# Patient Record
Sex: FEMALE | Race: WHITE | URBAN - METROPOLITAN AREA
[De-identification: names, ages, dates, MRNs, and addresses within clinical notes are randomized per-mention and may not be internally consistent; named-entity substitution may affect disease eponyms.]

---

## 2017-03-13 ENCOUNTER — OFFICE VISIT (OUTPATIENT)
Dept: CARDIOLOGY CLINIC | Age: 27
End: 2017-03-13

## 2017-03-13 VITALS
DIASTOLIC BLOOD PRESSURE: 76 MMHG | HEART RATE: 74 BPM | WEIGHT: 181.4 LBS | HEIGHT: 68 IN | RESPIRATION RATE: 16 BRPM | SYSTOLIC BLOOD PRESSURE: 120 MMHG | BODY MASS INDEX: 27.49 KG/M2

## 2017-03-13 DIAGNOSIS — R00.0 RAPID HEART RATE: Primary | ICD-10-CM

## 2017-03-13 DIAGNOSIS — R53.83 FATIGUE, UNSPECIFIED TYPE: ICD-10-CM

## 2017-03-13 DIAGNOSIS — R42 DIZZINESS: ICD-10-CM

## 2017-03-13 DIAGNOSIS — I49.9 CARDIAC ARRHYTHMIA, UNSPECIFIED CARDIAC ARRHYTHMIA TYPE: ICD-10-CM

## 2017-03-13 DIAGNOSIS — R00.2 PALPITATIONS: ICD-10-CM

## 2017-03-13 RX ORDER — ALBUTEROL SULFATE 90 UG/1
AEROSOL, METERED RESPIRATORY (INHALATION) AS NEEDED
COMMUNITY

## 2017-03-13 RX ORDER — ESCITALOPRAM OXALATE 10 MG/1
1 TABLET ORAL DAILY
Refills: 1 | COMMUNITY
Start: 2017-03-03

## 2017-03-13 NOTE — PROGRESS NOTES
[place]: Coletta Krabbe  [phone]  Requesting/referring provider: self  Reason for Consult: arrhythmia/palpitations    HPI: Reno López, a 31y.o. year-old who presents for evaluation of a pounding heart. She feels much more tired than usual, over the last 6 mos she has felt worse in the last few months. She is monitoring her heart rate with a fitbit and has seen it go up to 140-160 without activity. While she sleeps it stays low. NO syncope but has some dear sycnope early in the morning. She feels the pulse is fluttery or jittery in the morning at times. EKG is NSR, NST  Son just turned 3. No change postpartum. Assessment/Plan:  1. Body mass index is 27.58 kg/(m^2). 2. OCD tendencies on Lexparo it seems better  3. Anxiety on Lexapro is better. 4. Palpitations/arrhythmia- echo and loop to investigate. Fhx no early CAD, CVA in gm in her 76s. Soc no tob no etoh, 2 cups of coffee daily. She  has no past medical history on file. Cardiovascular ROS: positive for - irregular heartbeat and palpitations  Respiratory ROS: no cough, shortness of breath, or wheezing  Neurological ROS: no TIA or stroke symptoms  All other systems negative except as above. PE  Vitals:    03/13/17 1557   BP: 120/76   Pulse: 74   Resp: 16   Weight: 181 lb 6.4 oz (82.3 kg)   Height: 5' 8\" (1.727 m)    Body mass index is 27.58 kg/(m^2).    General appearance - alert, well appearing, and in no distress  Mental status - affect appropriate to mood  Eyes - sclera anicteric, moist mucous membranes  Neck - supple, no significant adenopathy  Lymphatics - no  lymphadenopathy  Chest - clear to auscultation, no wheezes, rales or rhonchi  Heart - normal rate, regular rhythm, normal S1, S2, no murmurs, rubs, clicks or gallops  Abdomen - soft, nontender, nondistended, no masses or organomegaly  Back exam - full range of motion, no tenderness  Neurological - cranial nerves II through XII grossly intact, no focal deficit  Musculoskeletal - no muscular tenderness noted, normal strength  Extremities - peripheral pulses normal, no pedal edema  Skin - normal coloration  no rashes    Recent Labs:  No results found for: CHOL, CHOLX, CHLST, CHOLV, [id number], HDL, LDL, DLDL, LDLC, DLDLP, TGL, TGLX, TRIGL, [id number], TRIGP, CHHD, CHHDX  No results found for: ANGEL, CREAPOC, MCREA, REFC7, ACREA, CREA, REFC3, REFC4  No results found for: BUN, BUNPOC, IBUN, MBUNV, BUNV  No results found for: K, KI, PLK, WBK  No results found for: HBA1C, HGBE8, ZIT6MGVE  No results found for: HGBPOC, HGB, HGBP, HGBEXT  No results found for: PLT, PLTEXT    Reviewed:  No past medical history on file. History   Smoking Status    Never Smoker   Smokeless Tobacco    Not on file     History   Alcohol Use No     No Known Allergies    Current Outpatient Prescriptions   Medication Sig    escitalopram oxalate (LEXAPRO) 10 mg tablet Take 1 Tab by mouth daily.  albuterol (PROVENTIL HFA, VENTOLIN HFA, PROAIR HFA) 90 mcg/actuation inhaler Take  by inhalation as needed for Wheezing. No current facility-administered medications for this visit.         MD [facility] [place]  [address] [facility]  [address]

## 2017-03-13 NOTE — MR AVS SNAPSHOT
Visit Information Date & Time Provider Department Dept. Phone Encounter #  
 3/13/2017  3:40 PM Stewart Mancera MD [facility] [place] [facility] [phone] [id number] Your Appointments 4/11/2017 10:00 AM  
ECHO CARDIOGRAMS 2D with ECHO, [facility] ([facility] SCHEDULING) Appt Note: echo for dizziness, rapid HR per Brooklynbasilia Larson [address]  [address] [place] [address]  
[phone] Upcoming Health Maintenance Date Due DTaP/Tdap/Td series (1 - Tdap) 5/8/2010 PAP AKA CERVICAL CYTOLOGY 5/8/2010 INFLUENZA AGE 9 TO ADULT 8/1/2016 Allergies as of 3/13/2017  Review Complete On: 3/13/2017 By: Davidson Woodson No Known Allergies Current Immunizations  Never Reviewed No immunizations on file. Not reviewed this visit You Were Diagnosed With   
  
 Codes Comments Rapid heart rate    -  Primary ICD-10-CM: R00.0 ICD-9-CM: 361. 0 Fatigue, unspecified type     ICD-10-CM: R53.83 ICD-9-CM: 780.79 Dizziness     ICD-10-CM: E01 ICD-9-CM: 780.4 Vitals BP Pulse Resp Height(growth percentile) Weight(growth percentile) BMI  
 120/76 (BP 1 Location: Right arm, BP Patient Position: Sitting) 74 16 5' 8\" (1.727 m) 181 lb 6.4 oz (82.3 kg) 27.58 kg/m2 Smoking Status Never Smoker Vitals History BMI and BSA Data Body Mass Index Body Surface Area  
 27.58 kg/m 2 1.99 m 2 Your Updated Medication List  
  
   
This list is accurate as of: 3/13/17  4:50 PM.  Always use your most recent med list.  
  
  
  
  
 albuterol 90 mcg/actuation inhaler Commonly known as:  PROVENTIL HFA, VENTOLIN HFA, PROAIR HFA Take  by inhalation as needed for Wheezing.  
  
 escitalopram oxalate 10 mg tablet Commonly known as:  David Dub Take 1 Tab by mouth daily. We Performed the Following AMB POC EKG ROUTINE W/ 12 LEADS, INTER & REP [59592 CPT(R)] Introducing [facility]! [facility] introduces [employer] patient portal. Now you can access parts of your medical record, email your doctor's office, and request medication refills online. 1. In your internet browser, go to [email or web address] 2. Click on the First Time User? Click Here link in the Sign In box. You will see the New Member Sign Up page. 3. Enter your [employer] Access Code exactly as it appears below. You will not need to use this code after youve completed the sign-up process. If you do not sign up before the expiration date, you must request a new code. · [employer] Access Code: [email or web address] Expires: 6/11/2017  4:50 PM 
 
4. Enter the last four digits of your Social Security Number (xxxx) and Date of Birth (mm/dd/yyyy) as indicated and click Submit. You will be taken to the next sign-up page. 5. Create a [employer] ID. This will be your [employer] login ID and cannot be changed, so think of one that is secure and easy to remember. 6. Create a [employer] password. You can change your password at any time. 7. Enter your Password Reset Question and Answer. This can be used at a later time if you forget your password. 8. Enter your e-mail address. You will receive e-mail notification when new information is available in [address]. 9. Click Sign Up. You can now view and download portions of your medical record. 10. Click the Download Summary menu link to download a portable copy of your medical information. If you have questions, please visit the Frequently Asked Questions section of the [employer] website. Remember, [employer] is NOT to be used for urgent needs. For medical emergencies, dial 911. Now available from your iPhone and Android! Please provide this summary of care documentation to your next provider. If you have any questions after today's visit, please call [phone].

## 2017-03-28 PROBLEM — I49.9 CARDIAC ARRHYTHMIA: Status: ACTIVE | Noted: 2017-03-28

## 2017-03-28 PROBLEM — R00.0 RAPID HEART RATE: Status: ACTIVE | Noted: 2017-03-28

## 2017-03-28 PROBLEM — R53.83 FATIGUE: Status: ACTIVE | Noted: 2017-03-28

## 2017-03-28 PROBLEM — R42 DIZZINESS: Status: ACTIVE | Noted: 2017-03-28
